# Patient Record
Sex: FEMALE | Race: BLACK OR AFRICAN AMERICAN | Employment: OTHER | URBAN - METROPOLITAN AREA
[De-identification: names, ages, dates, MRNs, and addresses within clinical notes are randomized per-mention and may not be internally consistent; named-entity substitution may affect disease eponyms.]

---

## 2017-01-17 ENCOUNTER — HOSPITAL ENCOUNTER (OUTPATIENT)
Dept: MAMMOGRAPHY | Age: 82
Discharge: HOME | End: 2017-01-17
Attending: FAMILY MEDICINE
Payer: MEDICARE

## 2017-01-17 DIAGNOSIS — Z79.52 LONG TERM CURRENT USE OF SYSTEMIC STEROIDS: ICD-10-CM

## 2017-01-17 DIAGNOSIS — Z78.0 POSTMENOPAUSAL STATE: ICD-10-CM

## 2017-01-17 PROCEDURE — 77080 DXA BONE DENSITY AXIAL: CPT

## 2017-03-02 ENCOUNTER — APPOINTMENT (OUTPATIENT)
Dept: GENERAL RADIOLOGY | Age: 82
End: 2017-03-02
Attending: EMERGENCY MEDICINE
Payer: MEDICARE

## 2017-03-02 ENCOUNTER — HOSPITAL ENCOUNTER (EMERGENCY)
Age: 82
Discharge: HOME | End: 2017-03-02
Attending: EMERGENCY MEDICINE
Payer: MEDICARE

## 2017-03-02 VITALS
OXYGEN SATURATION: 96 % | WEIGHT: 239 LBS | DIASTOLIC BLOOD PRESSURE: 51 MMHG | HEART RATE: 64 BPM | SYSTOLIC BLOOD PRESSURE: 167 MMHG | RESPIRATION RATE: 16 BRPM | HEIGHT: 67 IN | BODY MASS INDEX: 37.51 KG/M2 | TEMPERATURE: 98.3 F

## 2017-03-02 DIAGNOSIS — N18.9 CKD (CHRONIC KIDNEY DISEASE), UNSPECIFIED STAGE: ICD-10-CM

## 2017-03-02 DIAGNOSIS — D64.9 ANEMIA, UNSPECIFIED: ICD-10-CM

## 2017-03-02 DIAGNOSIS — R06.2 WHEEZING: Primary | ICD-10-CM

## 2017-03-02 LAB
ALBUMIN SERPL BCP-MCNC: 3 G/DL (ref 3.5–5)
ALBUMIN/GLOB SERPL: 0.7 {RATIO} (ref 1.1–2.2)
ALP SERPL-CCNC: 67 U/L (ref 45–117)
ALT SERPL-CCNC: 24 U/L (ref 12–78)
ANION GAP BLD CALC-SCNC: 13 MMOL/L (ref 5–15)
AST SERPL W P-5'-P-CCNC: 22 U/L (ref 15–37)
ATRIAL RATE: 63 BPM
BASOPHILS # BLD AUTO: 0 K/UL (ref 0–0.1)
BASOPHILS # BLD: 0 % (ref 0–1)
BILIRUB SERPL-MCNC: 0.3 MG/DL (ref 0.2–1)
BNP SERPL-MCNC: 1006 PG/ML (ref 0–450)
BUN SERPL-MCNC: 26 MG/DL (ref 6–20)
BUN/CREAT SERPL: 12 (ref 12–20)
CALCIUM SERPL-MCNC: 8.8 MG/DL (ref 8.5–10.1)
CALCULATED P AXIS, ECG09: 17 DEGREES
CALCULATED R AXIS, ECG10: -59 DEGREES
CHLORIDE SERPL-SCNC: 104 MMOL/L (ref 97–108)
CO2 SERPL-SCNC: 24 MMOL/L (ref 21–32)
CREAT SERPL-MCNC: 2.17 MG/DL (ref 0.55–1.02)
DIAGNOSIS, 93000: NORMAL
EOSINOPHIL # BLD: 0 K/UL (ref 0–0.4)
EOSINOPHIL NFR BLD: 0 % (ref 0–7)
ERYTHROCYTE [DISTWIDTH] IN BLOOD BY AUTOMATED COUNT: 17.8 % (ref 11.5–14.5)
GLOBULIN SER CALC-MCNC: 4.6 G/DL (ref 2–4)
GLUCOSE SERPL-MCNC: 83 MG/DL (ref 65–100)
HCT VFR BLD AUTO: 30.1 % (ref 35–47)
HGB BLD-MCNC: 9.3 G/DL (ref 11.5–16)
INR BLD: 2.8 (ref 0.9–1.2)
LYMPHOCYTES # BLD AUTO: 13 % (ref 12–49)
LYMPHOCYTES # BLD: 1.3 K/UL (ref 0.8–3.5)
MCH RBC QN AUTO: 24.7 PG (ref 26–34)
MCHC RBC AUTO-ENTMCNC: 30.9 G/DL (ref 30–36.5)
MCV RBC AUTO: 80.1 FL (ref 80–99)
MONOCYTES # BLD: 0.5 K/UL (ref 0–1)
MONOCYTES NFR BLD AUTO: 5 % (ref 5–13)
NEUTS SEG # BLD: 8.3 K/UL (ref 1.8–8)
NEUTS SEG NFR BLD AUTO: 82 % (ref 32–75)
P-R INTERVAL, ECG05: 116 MS
PLATELET # BLD AUTO: 269 K/UL (ref 150–400)
POTASSIUM SERPL-SCNC: 4.6 MMOL/L (ref 3.5–5.1)
PROT SERPL-MCNC: 7.6 G/DL (ref 6.4–8.2)
Q-T INTERVAL, ECG07: 444 MS
QRS DURATION, ECG06: 116 MS
QTC CALCULATION (BEZET), ECG08: 454 MS
RBC # BLD AUTO: 3.76 M/UL (ref 3.8–5.2)
SODIUM SERPL-SCNC: 141 MMOL/L (ref 136–145)
TROPONIN I SERPL-MCNC: <0.04 NG/ML
VENTRICULAR RATE, ECG03: 63 BPM
WBC # BLD AUTO: 10.1 K/UL (ref 3.6–11)

## 2017-03-02 PROCEDURE — 83880 ASSAY OF NATRIURETIC PEPTIDE: CPT | Performed by: EMERGENCY MEDICINE

## 2017-03-02 PROCEDURE — 74011000250 HC RX REV CODE- 250: Performed by: EMERGENCY MEDICINE

## 2017-03-02 PROCEDURE — 77030013140 HC MSK NEB VYRM -A

## 2017-03-02 PROCEDURE — 36415 COLL VENOUS BLD VENIPUNCTURE: CPT | Performed by: EMERGENCY MEDICINE

## 2017-03-02 PROCEDURE — 80053 COMPREHEN METABOLIC PANEL: CPT | Performed by: EMERGENCY MEDICINE

## 2017-03-02 PROCEDURE — 71020 XR CHEST PA LAT: CPT

## 2017-03-02 PROCEDURE — 85610 PROTHROMBIN TIME: CPT

## 2017-03-02 PROCEDURE — 84484 ASSAY OF TROPONIN QUANT: CPT | Performed by: EMERGENCY MEDICINE

## 2017-03-02 PROCEDURE — 99284 EMERGENCY DEPT VISIT MOD MDM: CPT

## 2017-03-02 PROCEDURE — 85025 COMPLETE CBC W/AUTO DIFF WBC: CPT | Performed by: EMERGENCY MEDICINE

## 2017-03-02 PROCEDURE — 93005 ELECTROCARDIOGRAM TRACING: CPT

## 2017-03-02 PROCEDURE — 94640 AIRWAY INHALATION TREATMENT: CPT

## 2017-03-02 RX ORDER — PREDNISONE 5 MG/1
7.5 TABLET ORAL DAILY
COMMUNITY

## 2017-03-02 RX ORDER — FLUTICASONE FUROATE AND VILANTEROL 100; 25 UG/1; UG/1
1 POWDER RESPIRATORY (INHALATION) DAILY
COMMUNITY

## 2017-03-02 RX ORDER — GLIPIZIDE 5 MG/1
2.5 TABLET ORAL
COMMUNITY

## 2017-03-02 RX ORDER — LANOLIN ALCOHOL/MO/W.PET/CERES
400 CREAM (GRAM) TOPICAL
COMMUNITY

## 2017-03-02 RX ORDER — WARFARIN 2 MG/1
6 TABLET ORAL 2 TIMES WEEKLY
COMMUNITY

## 2017-03-02 RX ORDER — ATORVASTATIN CALCIUM 20 MG/1
20 TABLET, FILM COATED ORAL
COMMUNITY

## 2017-03-02 RX ORDER — PROPRANOLOL HYDROCHLORIDE 10 MG/1
10 TABLET ORAL 2 TIMES DAILY
COMMUNITY

## 2017-03-02 RX ORDER — ALBUTEROL SULFATE 2.5 MG/.5ML
2.5 SOLUTION RESPIRATORY (INHALATION)
COMMUNITY

## 2017-03-02 RX ORDER — GLIPIZIDE 5 MG/1
5 TABLET ORAL
COMMUNITY

## 2017-03-02 RX ADMIN — ALBUTEROL SULFATE 1 DOSE: 2.5 SOLUTION RESPIRATORY (INHALATION) at 14:43

## 2017-03-02 NOTE — DISCHARGE INSTRUCTIONS
CONTINUE THE BREO AND NEBULIZER TREATMENTS. YOUR PRIMARY CARE DOCTOR SAID HE COULD SEE YOU ANY OF THE NEXT 3 DAYS IF NEEDED. KEEP YOUR APPOINTMENT WITH THE PULMONARY DOCTOR FOR Tuesday. RETURN TO THE ER IF WORSENING SHORTNESS OF BREATH, FEVER, CHEST PAIN, INCREASED LEG SWELLING OR ANY OTHER CONCERNS. Anemia: Care Instructions  Your Care Instructions    Anemia is a low level of red blood cells, which carry oxygen throughout your body. Many things can cause anemia. Lack of iron is one of the most common causes. Your body needs iron to make hemoglobin, a substance in red blood cells that carries oxygen from the lungs to your body's cells. Without enough iron, the body produces fewer and smaller red blood cells. As a result, your body's cells do not get enough oxygen, and you feel tired and weak. And you may have trouble concentrating. Bleeding is the most common cause of a lack of iron. You may have heavy menstrual bleeding or bleeding caused by conditions such as ulcers, hemorrhoids, or cancer. Regular use of aspirin or other anti-inflammatory medicines (such as ibuprofen) also can cause bleeding in some people. A lack of iron in your diet also can cause anemia, especially at times when the body needs more iron, such as during pregnancy, infancy, and the teen years. Your doctor may have prescribed iron pills. It may take several months of treatment for your iron levels to return to normal. Your doctor also may suggest that you eat foods that are rich in iron, such as meat and beans. There are many other causes of anemia. It is not always due to a lack of iron. Finding the specific cause of your anemia will help your doctor find the right treatment for you. Follow-up care is a key part of your treatment and safety. Be sure to make and go to all appointments, and call your doctor if you are having problems.  It's also a good idea to know your test results and keep a list of the medicines you take.  How can you care for yourself at home? · Take your medicines exactly as prescribed. Call your doctor if you think you are having a problem with your medicine. · If your doctor recommends iron pills, take them as directed:  ¨ Try to take the pills on an empty stomach about 1 hour before or 2 hours after meals. But you may need to take iron with food to avoid an upset stomach. ¨ Do not take antacids or drink milk or caffeine drinks (such as coffee, tea, or cola) at the same time or within 2 hours of the time that you take your iron. They can make it hard for your body to absorb the iron. ¨ Vitamin C (from food or supplements) helps your body absorb iron. Try taking iron pills with a glass of orange juice or some other food that is high in vitamin C, such as citrus fruits. ¨ Iron pills may cause stomach problems, such as heartburn, nausea, diarrhea, constipation, and cramps. Be sure to drink plenty of fluids, and include fruits, vegetables, and fiber in your diet each day. Iron pills often make your bowel movements dark or green. ¨ If you forget to take an iron pill, do not take a double dose of iron the next time you take a pill. ¨ Keep iron pills out of the reach of small children. An overdose of iron can be very dangerous. · Follow your doctor's advice about eating iron-rich foods. These include red meat, shellfish, poultry, eggs, beans, raisins, whole-grain bread, and leafy green vegetables. · Steam vegetables to help them keep their iron content. When should you call for help? Call 911 anytime you think you may need emergency care. For example, call if:  · You have symptoms of a heart attack. These may include:  ¨ Chest pain or pressure, or a strange feeling in the chest.  ¨ Sweating. ¨ Shortness of breath. ¨ Nausea or vomiting. ¨ Pain, pressure, or a strange feeling in the back, neck, jaw, or upper belly or in one or both shoulders or arms. ¨ Lightheadedness or sudden weakness.   ¨ A fast or irregular heartbeat. After you call 911, the  may tell you to chew 1 adult-strength or 2 to 4 low-dose aspirin. Wait for an ambulance. Do not try to drive yourself. · You passed out (lost consciousness). Call your doctor now or seek immediate medical care if:  · You have new or increased shortness of breath. · You are dizzy or lightheaded, or you feel like you may faint. · Your fatigue and weakness continue or get worse. · You have any abnormal bleeding, such as:  ¨ Nosebleeds. ¨ Vaginal bleeding that is different (heavier, more frequent, at a different time of the month) than what you are used to. ¨ Bloody or black stools, or rectal bleeding. ¨ Bloody or pink urine. Watch closely for changes in your health, and be sure to contact your doctor if:  · You do not get better as expected. Where can you learn more? Go to http://carleneViblioraza.info/. Enter R301 in the search box to learn more about \"Anemia: Care Instructions. \"  Current as of: February 5, 2016  Content Version: 11.1  © 5126-2137 [employer]. Care instructions adapted under license by [employer] (which disclaims liability or warranty for this information). If you have questions about a medical condition or this instruction, always ask your healthcare professional. [place] [address] any warranty or liability for your use of this information. Wheezing or Bronchoconstriction: Care Instructions  Your Care Instructions  Wheezing is a whistling noise made during breathing. It occurs when the small airways, or bronchial tubes, that lead to your lungs swell or contract (spasm) and become narrow. This narrowing is called bronchoconstriction. When your airways constrict, it is hard for air to pass through and this makes it hard for you to breathe. Wheezing and bronchoconstriction can be caused by many problems, including:  · An infection such as the flu or a cold.   · Allergies such as hay fever. · Diseases such as asthma or chronic obstructive pulmonary disease. · Smoking. Treatment for your wheezing depends on what is causing the problem. Your wheezing may get better without treatment. But you may need to pay attention to things that cause your wheezing and avoid them. Or you may need medicine to help treat the wheezing and to reduce the swelling or to relieve spasms in your lungs. Follow-up care is a key part of your treatment and safety. Be sure to make and go to all appointments, and call your doctor if you are having problems. It is also a good idea to know your test results and keep a list of the medicines you take. How can you care for yourself at home? · Take your medicine exactly as prescribed. Call your doctor if you think you are having a problem with your medicine. You will get more details on the specific medicine your doctor prescribes. · If your doctor prescribed antibiotics, take them as directed. Do not stop taking them just because you feel better. You need to take the full course of antibiotics. · Breathe moist air from a humidifier, hot shower, or sink filled with hot water. This may help ease your symptoms and make it easier for you to breathe. · If you have congestion in your nose and throat, drinking plenty of fluids, especially hot fluids, may help relieve your symptoms. If you have kidney, heart, or liver disease and have to limit fluids, talk with your doctor before you increase the amount of fluids you drink. · If you have mucus in your airways, it may help to breathe deeply and cough. · Do not smoke or allow others to smoke around you. Smoking can make your wheezing worse. If you need help quitting, talk to your doctor about stop-smoking programs and medicines. These can increase your chances of quitting for good. · Avoid things that may cause your wheezing.  These may include colds, smoke, air pollution, dust, pollen, pets, cockroaches, stress, and cold air. When should you call for help? Call 911 anytime you think you may need emergency care. For example, call if:  · You have severe trouble breathing. · You passed out (lost consciousness). Call your doctor now or seek immediate medical care if:  · You cough up yellow, dark brown, or bloody mucus (sputum). · You have new or worse shortness of breath. · Your wheezing is not getting better or it gets worse after you start taking your medicine. Watch closely for changes in your health, and be sure to contact your doctor if:  · You do not get better as expected. Where can you learn more? Go to http://carlene-raza.info/. Enter 454 9086 in the search box to learn more about \"Wheezing or Bronchoconstriction: Care Instructions. \"  Current as of: May 23, 2016  Content Version: 11.1  © 1078-4872 [employer]. Care instructions adapted under license by [employer] (which disclaims liability or warranty for this information). If you have questions about a medical condition or this instruction, always ask your healthcare professional. [address] any warranty or liability for your use of this information. We hope that we have addressed all of your medical concerns. The examination and treatment you received in the Emergency Department were for an emergent problem and were not intended as complete care. It is important that you follow up with your healthcare provider(s) for ongoing care. If your symptoms worsen or do not improve as expected, and you are unable to reach your usual health care provider(s), you should return to the Emergency Department. Today's healthcare is undergoing tremendous change, and patient satisfaction surveys are one of the many tools to assess the quality of medical care. You may receive a survey from the [employer] regarding your experience in the Emergency Department.   I hope that your experience has been completely positive, particularly the medical care that I provided. As such, please participate in the survey; anything less than excellent does not meet my expectations or intentions. [facility] and [facility] participate in nationally recognized quality of care measures. If your blood pressure is greater than 120/80, as reported below, we urge that you seek medical care to address the potential of high blood pressure, commonly known as hypertension. Hypertension can be hereditary or can be caused by certain medical conditions, pain, stress, or \"white coat syndrome. \"       Please make an appointment with your health care provider(s) for follow up of your Emergency Department visit. VITALS:   Patient Vitals for the past 8 hrs:   Temp Pulse Resp BP SpO2   03/02/17 1740 - - - - 92 %   03/02/17 1730 - - - 174/52 (!) 88 %   03/02/17 1720 - - - - 93 %   03/02/17 1719 - - - 173/52 (!) 89 %   03/02/17 1333 98.3 °F (36.8 °C) 64 16 176/74 95 %          Thank you for allowing us to provide you with medical care today. We realize that you have many choices for your emergency care needs. Please choose us in the future for any continued health care needs. [facility], [address] [place] [address].   Office: [phone]            Recent Results (from the past 24 hour(s))   EKG, 12 LEAD, INITIAL    Collection Time: 03/02/17  1:48 PM   Result Value Ref Range    Ventricular Rate 63 BPM    Atrial Rate 63 BPM    P-R Interval 116 ms    QRS Duration 116 ms    Q-T Interval 444 ms    QTC Calculation (Bezet) 454 ms    Calculated P Axis 17 degrees    Calculated R Axis -59 degrees    Diagnosis       Normal sinus rhythm  Left anterior fascicular block  Left ventricular hypertrophy with QRS widening  Abnormal ECG  When compared with ECG of 25-MAY-2016 11:31,  Ventricular rate increased from 47 beats to now 63 beats.    Confirmed by Anisa SCRUGGS, William Song ([id number]) on 3/2/2017 4:59:02 PM     CBC WITH AUTOMATED DIFF    Collection Time: 03/02/17  2:32 PM   Result Value Ref Range    WBC 10.1 3.6 - 11.0 K/uL    RBC 3.76 (L) 3.80 - 5.20 M/uL    HGB 9.3 (L) 11.5 - 16.0 g/dL    HCT 30.1 (L) 35.0 - 47.0 %    MCV 80.1 80.0 - 99.0 FL    MCH 24.7 (L) 26.0 - 34.0 PG    MCHC 30.9 30.0 - 36.5 g/dL    RDW 17.8 (H) 11.5 - 14.5 %    PLATELET 954 939 - 060 K/uL    NEUTROPHILS 82 (H) 32 - 75 %    LYMPHOCYTES 13 12 - 49 %    MONOCYTES 5 5 - 13 %    EOSINOPHILS 0 0 - 7 %    BASOPHILS 0 0 - 1 %    ABS. NEUTROPHILS 8.3 (H) 1.8 - 8.0 K/UL    ABS. LYMPHOCYTES 1.3 0.8 - 3.5 K/UL    ABS. MONOCYTES 0.5 0.0 - 1.0 K/UL    ABS. EOSINOPHILS 0.0 0.0 - 0.4 K/UL    ABS. BASOPHILS 0.0 0.0 - 0.1 K/UL   METABOLIC PANEL, COMPREHENSIVE    Collection Time: 03/02/17  2:32 PM   Result Value Ref Range    Sodium 141 136 - 145 mmol/L    Potassium 4.6 3.5 - 5.1 mmol/L    Chloride 104 97 - 108 mmol/L    CO2 24 21 - 32 mmol/L    Anion gap 13 5 - 15 mmol/L    Glucose 83 65 - 100 mg/dL    BUN 26 (H) 6 - 20 MG/DL    Creatinine 2.17 (H) 0.55 - 1.02 MG/DL    BUN/Creatinine ratio 12 12 - 20      GFR est AA 26 (L) >60 ml/min/1.73m2    GFR est non-AA 22 (L) >60 ml/min/1.73m2    Calcium 8.8 8.5 - 10.1 MG/DL    Bilirubin, total 0.3 0.2 - 1.0 MG/DL    ALT (SGPT) 24 12 - 78 U/L    AST (SGOT) 22 15 - 37 U/L    Alk. phosphatase 67 45 - 117 U/L    Protein, total 7.6 6.4 - 8.2 g/dL    Albumin 3.0 (L) 3.5 - 5.0 g/dL    Globulin 4.6 (H) 2.0 - 4.0 g/dL    A-G Ratio 0.7 (L) 1.1 - 2.2     TROPONIN I    Collection Time: 03/02/17  2:32 PM   Result Value Ref Range    Troponin-I, Qt. <0.04 <0.05 ng/mL   PRO-BNP    Collection Time: 03/02/17  2:32 PM   Result Value Ref Range    NT pro-BNP 1006 (H) 0 - 450 PG/ML   POC INR    Collection Time: 03/02/17  4:15 PM   Result Value Ref Range    INR (POC) 2.8 (H) <1.2         Xr Chest Pa Lat    Result Date: 3/2/2017  EXAM:  XR CHEST PA LAT. INDICATION: Wheezing. COMPARISON: 5/22/2016. FINDINGS: PA and lateral radiographs of the chest were obtained. Lungs: There are mild diffuse increased interstitial markings throughout the lungs which are probably unchanged. There is no mass, nodule or airspace disease. Pleura: There is no pleural effusion or pneumothorax. Mediastinum: The cardiac silhouette is enlarged and the aorta is atherosclerotic. Bones and soft tissues: There are surgical clips in the left axilla and there are degenerative changes of both shoulders. IMPRESSION: Cardiomegaly with mild interstitial edema or interstitial disease. No change.

## 2017-03-02 NOTE — ED PROVIDER NOTES
HPI Comments: 80 y.o. female with past medical history significant for HTN, high cholesterol, diabetes, and kidney failure who presents from home with chief complaint of cough. Pt states that she has had a cough for the past 3 years which has worsened over the course of the past 6-8 months. Pt's daughter claims that the pt has been very SOB and wheezing alot, and hasn't been able to talk on the phone well as a result. Pt says that every time she coughs, she experiences some urinary incontinence. Per daughter, pt told her 4 days ago that she was out of her Breo inhaler, which she started the pt on again in addition to breathing treatments, which the daughter says she is unsure if the pt is giving herself when when she is not there to monitor. Per daughter, pt has been on prednisone for \"years\", and the pt's dosage is being decreased from 10 mg to 7.5 mg. Per daughter, pt has a scheduled appointment with her PCP in 5 days in an attempt to have the pt placed with a pulmonologist, but pt's daughter felt as though the pt couldn't wait that long. Pt denies that her cough is productive. Pt denies fever, vomiting, diarrhea or CP. There are no other acute medical concerns at this time. PCP: Joe Adams MD     Note written by Chaz Palma. Bridgette Edgar, as dictated by Vanessa Marie MD 2:10 PM      The history is provided by the patient and a relative (daughter). No  was used.         Past Medical History:   Diagnosis Date    Arthritis     Asthma     Cough     Diabetes ([address]     Falls     Fatigue     H/O total mastectomy of left breast     Heart disease     High cholesterol     Hypertension     Kidney disease     Kidney failure     Memory loss     Rash     SOB (shortness of breath)        Past Surgical History:   Procedure Laterality Date    BREAST SURGERY PROCEDURE UNLISTED      left mastectomy    HX ORTHOPAEDIC      hip replacement         Family History:   Problem Relation Age of Onset    Asthma Mother     Cancer Mother     COPD Brother     Cancer Other        Social History     Social History    Marital status:      Spouse name: N/A    Number of children: N/A    Years of education: N/A     Occupational History    Not on file. Social History Main Topics    Smoking status: Former Smoker    Smokeless tobacco: Not on file    Alcohol use Yes    Drug use: Not on file    Sexual activity: Not on file     Other Topics Concern    Not on file     Social History Narrative         ALLERGIES: Lisinopril; Penicillin g; and Sulfa (sulfonamide antibiotics)    Review of Systems   Constitutional: Negative for fever. HENT: Negative for facial swelling. Eyes: Negative for visual disturbance. Respiratory: Positive for cough, shortness of breath and wheezing. Negative for chest tightness. Cardiovascular: Negative for chest pain. Gastrointestinal: Negative for abdominal pain, diarrhea and vomiting. Genitourinary: Negative for dysuria. Musculoskeletal: Negative for arthralgias. Skin: Negative for rash. Neurological: Negative for dizziness. Hematological: Negative for adenopathy. Psychiatric/Behavioral: Negative for suicidal ideas. All other systems reviewed and are negative. Vitals:    03/02/17 1333   BP: 176/74   Pulse: 64   Resp: 16   Temp: 98.3 °F (36.8 °C)   SpO2: 95%   Weight: 108.4 kg (239 lb)   Height: 5' 6.5\" (1.689 m)            Physical Exam   Constitutional: She is oriented to person, place, and time. She appears well-developed and well-nourished. No distress. HENT:   Head: Normocephalic and atraumatic. Mouth/Throat: Oropharynx is clear and moist.   Eyes: Pupils are equal, round, and reactive to light. No scleral icterus. Neck: Normal range of motion. Neck supple. No thyromegaly present. Cardiovascular: Normal rate, regular rhythm, normal heart sounds and intact distal pulses. No murmur heard.   Pulmonary/Chest: Effort normal. No respiratory distress. She has wheezes (expiratory). Abdominal: Soft. Bowel sounds are normal. She exhibits no distension. There is no tenderness. Musculoskeletal: Normal range of motion. She exhibits no edema. Neurological: She is alert and oriented to person, place, and time. Skin: Skin is warm and dry. No rash noted. She is not diaphoretic. Nursing note and vitals reviewed. Note written by Dunia York. Branden Hooker, as dictated by Jose Craig MD 2:10 PM      MDM  Number of Diagnoses or Management Options  Anemia, unspecified:   CKD (chronic kidney disease), unspecified stage:   Wheezing:   Diagnosis management comments: A:  Cough and wheezing for several years but worse over past 2 days. VS stable on baseline O2. No resp distress note. Slight expiratory wheeze present. DDx:  Bronchitis, pneumonia, CHF,  Progression of chronic lung disease    P:  ecg  cxr  Labs  duoneb  reassess    ED Course       Procedures    Labs and ECG unremarkable. BNP mildly elevated but not very significant and I don't feel this represents acute CHF. Pt signed out to Dr. Estefani Pérez pending results of CXR and reassessment following duoneb.

## 2017-03-02 NOTE — ED TRIAGE NOTES
Per pt's family, pt. Has a nonproductive cough for the past week with wheezing. States pain to chest on inspiration.

## 2017-03-02 NOTE — PROGRESS NOTES
[facility]: MED RECONCILIATION    Comments/Recommendations: - Verified allergies. - Daughter helps fill medications in a pill box and this helps to increase adherence. However, she still misses doses 3-4 times per week. This may occasionally include her warfarin.  - Patient is on coumadin for blood clot treatment and had an INR check this Tuesday 2/28/17. INR was SUPRAtherapeutic at 4.2. Patient was taking 7mg PO 6 times per week Sunday to Friday and 6mg on Saturday, this was decreased to 7mg PO 5 times per week Monday to Friday and 6mg on Saturday and Sunday. She has not taken and warfarin since appointment on Tuesday.     Medications added:     · Breo ellipta  · Albuterol nebulizer  · Propranolol    Medications removed:    · Amlodipine 10mg PO daily - D/C'ed 2/28/17 due to peripheral edema    Medications adjusted:    · Atorvastatin 40mg changed to 20mg PO nightly  · Iron dose decreased to 18mg elemental iron versus 27mg  · Furosemide 20mg adjusted to 2 tabs (40mg) PO daily  · Glipizide 5mg updated to 5mg PO daily before breakfast, and 2.5mg PO daily before bedtime  · Prednisone dose decreased from 10mg to 7.5mg PO daily    Information obtained from: Child, Patient, refill history    Significant PMH/Disease States:   Patient Active Problem List   Diagnosis Code    Hypertension I10    High cholesterol E78.00    TESS (acute kidney injury) ([facility] N17.9    CKD (chronic kidney disease) stage 3, GFR 30-59 ml/min N18.3    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 Taking? albuterol sulfate (PROVENTIL;VENTOLIN) 2.5 mg/0.5 mL nebu nebulizer solution 3/1/2017 at HS Child Yes Yes   Si.5 mg by Nebulization route every four (4) hours as needed for Wheezing. aspirin delayed-release 81 mg tablet 3/1/2017 at HS Child Yes Yes   Sig: Take 81 mg by mouth nightly. atorvastatin (LIPITOR) 20 mg tablet 3/1/2017 at HS Child Yes Yes   Sig: Take 20 mg by mouth nightly. cholecalciferol, VITAMIN D3, (VITAMIN D3) 5,000 unit tab tablet 2017 at AM Child Yes Yes   Sig: Take 5,000 Units by mouth daily. fluticasone-vilanterol (BREO ELLIPTA) 100-25 mcg/dose inhaler 2017 at AM Child Yes Yes   Sig: Take 1 Puff by inhalation daily. furosemide (LASIX) 20 mg tablet 3/2/2017 at AM Child Yes Yes   Sig: Take 40 mg by mouth daily. glipiZIDE (GLUCOTROL) 5 mg tablet 3/1/2017 at AM Child Yes Yes   Sig: Take 5 mg by mouth Daily (before breakfast). In addition to 2.5mg PO daily before bedtime   glipiZIDE (GLUCOTROL) 5 mg tablet 3/1/2017 at HS Child Yes Yes   Sig: Take 2.5 mg by mouth nightly. hydrALAZINE (APRESOLINE) 25 mg tablet 3/2/2017 at AM Child Yes Yes   Sig: Take 25 mg by mouth three (3) times daily. hydroxychloroquine (PLAQUENIL) 200 mg tablet 3/2/2017 at AM Child Yes Yes   Sig: Take 200 mg by mouth two (2) times a day. Indications: SYSTEMIC LUPUS ERYTHEMATOSUS   magnesium oxide (MAG-OX) 400 mg tablet 3/1/2017 at [address] [place] [address] Yes Yes   Sig: Take 400 mg by mouth daily (with lunch). multivitamin-ferrous sulfate 18 mg iron tab 2017 at AM Child Yes Yes   Sig: Take 18 mg by mouth daily. predniSONE (DELTASONE) 5 mg tablet 3/2/2017 at AM Child Yes Yes   Sig: Take 7.5 mg by mouth daily. propranolol (INDERAL) 10 mg tablet 3/2/2017 at AM Child Yes Yes   Sig: Take 10 mg by mouth two (2) times a day. sodium bicarbonate 325 mg tablet 3/2/2017 at AM Child Yes Yes   Sig: Take 325 mg by mouth two (2) times a day.    warfarin (COUMADIN) 2 mg tablet 2/27/2017 at HS Child Yes Yes   Sig: Take 7 mg by mouth five (5) days a week. Monday to Friday  Indications: DEEP VENOUS THROMBOSIS   warfarin (COUMADIN) 2 mg tablet 3/5/2016 at HS Child Yes Yes   Sig: Take 6 mg by mouth two (2) times a week.  On Saturday and Sunday   Indications: DEEP VENOUS THROMBOSIS      Facility-Administered Medications: None       Thank you,  Ameya Garg, PharmD     Contact: [phone]

## 2017-03-02 NOTE — ED NOTES
Pt given discharge instructions by provider. Opportunity given to ask questions. Pt wheeled to waiting area via wheelchair by family members.

## 2017-03-02 NOTE — ED NOTES
[facility] from Dr. Saloni Leblanc pending cxr.      6:02 PM  Minimal expiratory wheeze. sats low 90's. Has albuteral at home. D/w Dr. Laura Armenta and reviewed results with him. He recommends keeping steroids as is at 7.5 mg. Family states she has albuterol which they will continue. Pt has breo refill now. He can see her over the weekend if necessary. She has f/u with pulmonary in 5 days - Dr. Antoni Dahl. 6:03 PM  Patient's results have been reviewed with them. SATS 93% RA. Patient and/or family have verbally conveyed their understanding and agreement of the patient's signs, symptoms, diagnosis, treatment and prognosis and additionally agree to follow up as recommended or return to the Emergency Room should their condition change prior to follow-up. Discharge instructions have also been provided to the patient with some educational information regarding their diagnosis as well a list of reasons why they would want to return to the ER prior to their follow-up appointment should their condition change. Recent Results (from the past 24 hour(s))   EKG, 12 LEAD, INITIAL    Collection Time: 03/02/17  1:48 PM   Result Value Ref Range    Ventricular Rate 63 BPM    Atrial Rate 63 BPM    P-R Interval 116 ms    QRS Duration 116 ms    Q-T Interval 444 ms    QTC Calculation (Bezet) 454 ms    Calculated P Axis 17 degrees    Calculated R Axis -59 degrees    Diagnosis       Normal sinus rhythm  Left anterior fascicular block  Left ventricular hypertrophy with QRS widening  Abnormal ECG  When compared with ECG of 25-MAY-2016 11:31,  Ventricular rate increased from 47 beats to now 63 beats.    Confirmed by Anisa SCRUGGS, [address] [place] [address] ([id number]) on 3/2/2017 4:59:02 PM     CBC WITH AUTOMATED DIFF    Collection Time: 03/02/17  2:32 PM   Result Value Ref Range    WBC 10.1 3.6 - 11.0 K/uL    RBC 3.76 (L) 3.80 - 5.20 M/uL    HGB 9.3 (L) 11.5 - 16.0 g/dL    HCT 30.1 (L) 35.0 - 47.0 %    MCV 80.1 80.0 - 99.0 FL    MCH 24.7 (L) 26.0 - 34.0 PG MCHC 30.9 30.0 - 36.5 g/dL    RDW 17.8 (H) 11.5 - 14.5 %    PLATELET 719 742 - 617 K/uL    NEUTROPHILS 82 (H) 32 - 75 %    LYMPHOCYTES 13 12 - 49 %    MONOCYTES 5 5 - 13 %    EOSINOPHILS 0 0 - 7 %    BASOPHILS 0 0 - 1 %    ABS. NEUTROPHILS 8.3 (H) 1.8 - 8.0 K/UL    ABS. LYMPHOCYTES 1.3 0.8 - 3.5 K/UL    ABS. MONOCYTES 0.5 0.0 - 1.0 K/UL    ABS. EOSINOPHILS 0.0 0.0 - 0.4 K/UL    ABS. BASOPHILS 0.0 0.0 - 0.1 K/UL   METABOLIC PANEL, COMPREHENSIVE    Collection Time: 03/02/17  2:32 PM   Result Value Ref Range    Sodium 141 136 - 145 mmol/L    Potassium 4.6 3.5 - 5.1 mmol/L    Chloride 104 97 - 108 mmol/L    CO2 24 21 - 32 mmol/L    Anion gap 13 5 - 15 mmol/L    Glucose 83 65 - 100 mg/dL    BUN 26 (H) 6 - 20 MG/DL    Creatinine 2.17 (H) 0.55 - 1.02 MG/DL    BUN/Creatinine ratio 12 12 - 20      GFR est AA 26 (L) >60 ml/min/1.73m2    GFR est non-AA 22 (L) >60 ml/min/1.73m2    Calcium 8.8 8.5 - 10.1 MG/DL    Bilirubin, total 0.3 0.2 - 1.0 MG/DL    ALT (SGPT) 24 12 - 78 U/L    AST (SGOT) 22 15 - 37 U/L    Alk. phosphatase 67 45 - 117 U/L    Protein, total 7.6 6.4 - 8.2 g/dL    Albumin 3.0 (L) 3.5 - 5.0 g/dL    Globulin 4.6 (H) 2.0 - 4.0 g/dL    A-G Ratio 0.7 (L) 1.1 - 2.2     TROPONIN I    Collection Time: 03/02/17  2:32 PM   Result Value Ref Range    Troponin-I, Qt. <0.04 <0.05 ng/mL   PRO-BNP    Collection Time: 03/02/17  2:32 PM   Result Value Ref Range    NT pro-BNP 1006 (H) 0 - 450 PG/ML   POC INR    Collection Time: 03/02/17  4:15 PM   Result Value Ref Range    INR (POC) 2.8 (H) <1.2         Xr Chest Pa Lat    Result Date: 3/2/2017  EXAM:  XR CHEST PA LAT. INDICATION: Wheezing. COMPARISON: 5/22/2016. FINDINGS: PA and lateral radiographs of the chest were obtained. Lungs: There are mild diffuse increased interstitial markings throughout the lungs which are probably unchanged. There is no mass, nodule or airspace disease. Pleura: There is no pleural effusion or pneumothorax.  Mediastinum: The cardiac silhouette is enlarged and the aorta is atherosclerotic. Bones and soft tissues: There are surgical clips in the left axilla and there are degenerative changes of both shoulders. IMPRESSION: Cardiomegaly with mild interstitial edema or interstitial disease. No change.

## 2017-05-26 ENCOUNTER — HOSPITAL ENCOUNTER (EMERGENCY)
Age: 82
Discharge: HOME | End: 2017-05-26
Attending: EMERGENCY MEDICINE
Payer: MEDICARE

## 2017-05-26 ENCOUNTER — APPOINTMENT (OUTPATIENT)
Dept: GENERAL RADIOLOGY | Age: 82
End: 2017-05-26
Attending: EMERGENCY MEDICINE
Payer: MEDICARE

## 2017-05-26 VITALS
BODY MASS INDEX: 35.84 KG/M2 | TEMPERATURE: 98 F | DIASTOLIC BLOOD PRESSURE: 68 MMHG | HEART RATE: 58 BPM | OXYGEN SATURATION: 96 % | WEIGHT: 223 LBS | HEIGHT: 66 IN | RESPIRATION RATE: 20 BRPM | SYSTOLIC BLOOD PRESSURE: 158 MMHG

## 2017-05-26 DIAGNOSIS — M79.604 RIGHT LEG PAIN: Primary | ICD-10-CM

## 2017-05-26 LAB — INR BLD: 2.7 (ref 0.9–1.2)

## 2017-05-26 PROCEDURE — 74011250637 HC RX REV CODE- 250/637: Performed by: EMERGENCY MEDICINE

## 2017-05-26 PROCEDURE — 73552 X-RAY EXAM OF FEMUR 2/>: CPT

## 2017-05-26 PROCEDURE — 99283 EMERGENCY DEPT VISIT LOW MDM: CPT

## 2017-05-26 PROCEDURE — 85610 PROTHROMBIN TIME: CPT

## 2017-05-26 RX ORDER — TRAMADOL HYDROCHLORIDE 50 MG/1
50 TABLET ORAL
Qty: 10 TAB | Refills: 0 | Status: SHIPPED | OUTPATIENT
Start: 2017-05-26 | End: 2017-06-05

## 2017-05-26 RX ORDER — ACETAMINOPHEN 325 MG/1
650 TABLET ORAL
Status: COMPLETED | OUTPATIENT
Start: 2017-05-26 | End: 2017-05-26

## 2017-05-26 RX ADMIN — ACETAMINOPHEN 650 MG: 325 TABLET ORAL at 14:58

## 2017-05-26 NOTE — DISCHARGE INSTRUCTIONS
Leg Pain: Care Instructions  Your Care Instructions  Many things can cause leg pain. Too much exercise or overuse can cause a muscle cramp (or charley horse). You can get leg cramps from not eating a balanced diet that has enough potassium, calcium, and other minerals. If you do not drink enough fluids or are taking certain medicines, you may develop leg cramps. Other causes of leg pain include injuries, blood flow problems, nerve damage, and twisted and enlarged veins (varicose veins). You can usually ease pain with self-care. Your doctor may recommend that you rest your leg and keep it elevated. Follow-up care is a key part of your treatment and safety. Be sure to make and go to all appointments, and call your doctor if you are having problems. Its also a good idea to know your test results and keep a list of the medicines you take. How can you care for yourself at home? · Take pain medicines exactly as directed. ¨ If the doctor gave you a prescription medicine for pain, take it as prescribed. ¨ If you are not taking a prescription pain medicine, ask your doctor if you can take an over-the-counter medicine. · Take any other medicines exactly as prescribed. Call your doctor if you think you are having a problem with your medicine. · Rest your leg while you have pain, and avoid standing for long periods of time. · Prop up your leg at or above the level of your heart when possible. · Make sure you are eating a balanced diet that is rich in calcium, potassium, and magnesium, especially if you are pregnant. · If directed by your doctor, put ice or a cold pack on the area for 10 to 20 minutes at a time. Put a thin cloth between the ice and your skin. · Your leg may be in a splint, a brace, or an elastic bandage, and you may have crutches to help you walk. Follow your doctor's directions about how long to wear supports and how to use the crutches. When should you call for help?   Call 911 anytime you think you may need emergency care. For example, call if:  · You have sudden chest pain and shortness of breath, or you cough up blood. · Your leg is cool or pale or changes color. Call your doctor now or seek immediate medical care if:  · You have increasing or severe pain. · Your leg suddenly feels weak and you cannot move it. · You have signs of a blood clot, such as:  ¨ Pain in your calf, back of the knee, thigh, or groin. ¨ Redness and swelling in your leg or groin. · You have signs of infection, such as:  ¨ Increased pain, swelling, warmth, or redness. ¨ Red streaks leading from the sore area. ¨ Pus draining from a place on your leg. ¨ A fever. · You cannot bear weight on your leg. Watch closely for changes in your health, and be sure to contact your doctor if:  · You do not get better as expected. Where can you learn more? Go to http://carlene-raza.info/. Enter X936 in the search box to learn more about \"Leg Pain: Care Instructions. \"  Current as of: May 27, 2016  Content Version: 11.2  © 6546-1422 [employer]. Care instructions adapted under license by [employer] (which disclaims liability or warranty for this information). If you have questions about a medical condition or this instruction, always ask your healthcare professional. [address] any warranty or liability for your use of this information. We hope that we have addressed all of your medical concerns. The examination and treatment you received in the Emergency Department were for an emergent problem and were not intended as complete care. It is important that you follow up with your healthcare provider(s) for ongoing care. If your symptoms worsen or do not improve as expected, and you are unable to reach your usual health care provider(s), you should return to the Emergency Department.       Today's healthcare is undergoing tremendous change, and patient satisfaction surveys are one of the many tools to assess the quality of medical care. You may receive a survey from the [employer] organization regarding your experience in the Emergency Department. I hope that your experience has been completely positive, particularly the medical care that I provided. As such, please participate in the survey; anything less than excellent does not meet my expectations or intentions. [facility] and [facility] participate in nationally recognized quality of care measures. If your blood pressure is greater than 120/80, as reported below, we urge that you seek medical care to address the potential of high blood pressure, commonly known as hypertension. Hypertension can be hereditary or can be caused by certain medical conditions, pain, stress, or \"white coat syndrome. \"       Please make an appointment with your health care provider(s) for follow up of your Emergency Department visit. VITALS:   Patient Vitals for the past 8 hrs:   Temp Pulse Resp BP SpO2   05/26/17 1315 98 °F (36.7 °C) (!) 58 20 158/68 96 %          Thank you for allowing us to provide you with medical care today. We realize that you have many choices for your emergency care needs. Please choose us in the future for any continued health care needs. Xochitl Sullivan, [facility]   Office: [phone]            Recent Results (from the past 24 hour(s))   POC INR    Collection Time: 05/26/17  3:47 PM   Result Value Ref Range    INR (POC) 2.7 (H) <1.2         Xr Femur Rt 2 Vs    Result Date: 5/26/2017  EXAM:  XR FEMUR RT 2 VS INDICATION:   atraumatic upper leg pain. Additional history: Right hip pain x3 days radiating into the thigh. COMPARISON: None. FINDINGS: Two views of the right femur demonstrate right hip arthroplasty without evidence of hardware complication. No visible fracture. Osteopenia. IMPRESSION: 1. Right hip arthroplasty without evidence of hardware complication.

## 2017-05-26 NOTE — ED PROVIDER NOTES
HPI Comments: 80 y.o. female with past medical history significant for HTN, high cholesterol, diabetes, kidney failure, heart disease, arthritis, asthma, and total mastectomy who presents with chief complaint of leg pain. Pt complains of R-hip pain and R-medial thigh pain that began 2 days ago. Pt notes that she is s/p R-hip replacement; performed by Dr. Ashley Schwarz in 2005. Pt denies any recent fall or injury to her leg. She reports being able to ambulate \"poorly\" at baseline. Pt notes additional sx of bilateral foot numbness, which is chronic. Pt took tylenol at 1000 today (~4.5 hours ago) with some relief. She denies any fever, dysuria, back pain, abd pain, or new urinary incontinence. There are no other acute medical concerns at this time. PCP: Cristiano Kumar MD    Note written by Lis Russo, as dictated by Petey Jara MD 2:48 PM      The history is provided by the patient. No  was used. Past Medical History:   Diagnosis Date    Arthritis     Asthma     Cough     Diabetes ([address]     Falls     Fatigue     H/O total mastectomy of left breast     Heart disease     High cholesterol     Hypertension     Kidney disease     Kidney failure     Memory loss     Rash     SOB (shortness of breath)        Past Surgical History:   Procedure Laterality Date    BREAST SURGERY PROCEDURE UNLISTED      left mastectomy    HX ORTHOPAEDIC      hip replacement         Family History:   Problem Relation Age of Onset    Asthma Mother     Cancer Mother     COPD Brother     Cancer Other        Social History     Social History    Marital status:      Spouse name: N/A    Number of children: N/A    Years of education: N/A     Occupational History    Not on file.      Social History Main Topics    Smoking status: Former Smoker    Smokeless tobacco: Not on file    Alcohol use Yes    Drug use: No    Sexual activity: Not on file     Other Topics Concern    Not on file     Social History Narrative         ALLERGIES: Lisinopril; Penicillin g; and Sulfa (sulfonamide antibiotics)    Review of Systems   Constitutional: Negative for fever. Gastrointestinal: Negative for abdominal pain. Genitourinary: Negative for difficulty urinating and dysuria. Musculoskeletal: Positive for arthralgias (R-hip) and myalgias (RLE). Negative for back pain. Neurological: Positive for numbness (bilateral feet). All other systems reviewed and are negative.       Vitals:    05/26/17 1315   BP: 158/68   Pulse: (!) 58   Resp: 20   Temp: 98 °F (36.7 °C)   SpO2: 96%   Weight: 101.2 kg (223 lb)   Height: 5' 5.5\" (1.664 m)            Physical Exam   Physical Examination: General appearance - alert, elderly, chronically ill appearing, mild distress, oriented to person, place, and time and normal appearing weight  Eyes - pupils equal and reactive, extraocular eye movements intact  Neck - supple, no significant adenopathy  Chest - clear to auscultation, no wheezes, rales or rhonchi, symmetric air entry  Heart - normal rate, regular rhythm, normal S1, S2, no murmurs, rubs, clicks or gallops  Abdomen - soft, nontender, nondistended, no masses or organomegaly  Back exam - full range of motion, no tenderness, palpable spasm or pain on motion  Neurological - alert, oriented, normal speech, no focal findings or movement disorder noted  Musculoskeletal - well healed surgical scar to right lateral hip, full rom of right hip, NVI distally with strong pedal pulses, mild tenderness to left upper thigh medially, no rash or swelling, no palpable cords  Extremities - peripheral pulses normal, no pedal edema, no clubbing or cyanosis  Skin - normal coloration and turgor, no rashes, no suspicious skin lesions noted  MDM  Number of Diagnoses or Management Options  Right leg pain:      Amount and/or Complexity of Data Reviewed  Clinical lab tests: ordered and reviewed  Tests in the radiology section of CPT®: ordered and reviewed  Decide to obtain previous medical records or to obtain history from someone other than the patient: yes  Obtain history from someone other than the patient: yes (son)  Review and summarize past medical records: yes  Discuss the patient with other providers: yes (pcp)  Independent visualization of images, tracings, or specimens: yes      ED Course       Procedures    Son states pt with leg pain for a while. Seen by vascular a few days ago and had negative doppler of legs. INR 2.7. Will d/c with pcp f/u. Discussed with Dr. Rita Phelps, on call for Dr. Yohana Franco. Pt is able to ambulate. Son ok with plan for d/c.

## 2017-05-26 NOTE — ED TRIAGE NOTES
Right hip pain since Tuesday this week, pain goes down right hip and into right leg anterior thigh. No pain at rest. Denies fall or any injury. [facility] hip replacement 2005. Called ortho but unable to get in until July.

## 2017-06-28 ENCOUNTER — HOSPITAL ENCOUNTER (OUTPATIENT)
Dept: CT IMAGING | Age: 82
Discharge: HOME | End: 2017-06-28
Attending: NURSE PRACTITIONER
Payer: MEDICARE

## 2017-06-28 DIAGNOSIS — R91.8 ABNORMAL CT SCAN, LUNG: ICD-10-CM

## 2017-06-28 PROCEDURE — 71250 CT THORAX DX C-: CPT

## 2017-07-14 ENCOUNTER — HOSPITAL ENCOUNTER (EMERGENCY)
Age: 82
Discharge: HOME | End: 2017-07-14
Attending: EMERGENCY MEDICINE | Admitting: EMERGENCY MEDICINE
Payer: MEDICARE

## 2017-07-14 VITALS
OXYGEN SATURATION: 94 % | TEMPERATURE: 98 F | WEIGHT: 213 LBS | HEART RATE: 67 BPM | DIASTOLIC BLOOD PRESSURE: 74 MMHG | HEIGHT: 66 IN | SYSTOLIC BLOOD PRESSURE: 135 MMHG | RESPIRATION RATE: 15 BRPM | BODY MASS INDEX: 34.23 KG/M2

## 2017-07-14 DIAGNOSIS — E16.2 HYPOGLYCEMIA: Primary | ICD-10-CM

## 2017-07-14 LAB
ANION GAP BLD CALC-SCNC: 11 MMOL/L (ref 5–15)
BUN SERPL-MCNC: 27 MG/DL (ref 6–20)
BUN/CREAT SERPL: 12 (ref 12–20)
CALCIUM SERPL-MCNC: 8.6 MG/DL (ref 8.5–10.1)
CHLORIDE SERPL-SCNC: 105 MMOL/L (ref 97–108)
CO2 SERPL-SCNC: 25 MMOL/L (ref 21–32)
CREAT SERPL-MCNC: 2.33 MG/DL (ref 0.55–1.02)
GLUCOSE BLD STRIP.AUTO-MCNC: 100 MG/DL (ref 65–100)
GLUCOSE BLD STRIP.AUTO-MCNC: 104 MG/DL (ref 65–100)
GLUCOSE BLD STRIP.AUTO-MCNC: 74 MG/DL (ref 65–100)
GLUCOSE BLD STRIP.AUTO-MCNC: 80 MG/DL (ref 65–100)
GLUCOSE SERPL-MCNC: 136 MG/DL (ref 65–100)
POTASSIUM SERPL-SCNC: 3.4 MMOL/L (ref 3.5–5.1)
SERVICE CMNT-IMP: ABNORMAL
SERVICE CMNT-IMP: NORMAL
SODIUM SERPL-SCNC: 141 MMOL/L (ref 136–145)

## 2017-07-14 PROCEDURE — 99285 EMERGENCY DEPT VISIT HI MDM: CPT

## 2017-07-14 PROCEDURE — 36415 COLL VENOUS BLD VENIPUNCTURE: CPT | Performed by: EMERGENCY MEDICINE

## 2017-07-14 PROCEDURE — 80048 BASIC METABOLIC PNL TOTAL CA: CPT | Performed by: EMERGENCY MEDICINE

## 2017-07-14 PROCEDURE — 82962 GLUCOSE BLOOD TEST: CPT

## 2017-07-14 RX ORDER — SODIUM CHLORIDE 0.9 % (FLUSH) 0.9 %
5-10 SYRINGE (ML) INJECTION EVERY 8 HOURS
Status: DISCONTINUED | OUTPATIENT
Start: 2017-07-14 | End: 2017-07-14 | Stop reason: HOSPADM

## 2017-07-14 RX ORDER — SODIUM CHLORIDE 0.9 % (FLUSH) 0.9 %
5-10 SYRINGE (ML) INJECTION AS NEEDED
Status: DISCONTINUED | OUTPATIENT
Start: 2017-07-14 | End: 2017-07-14 | Stop reason: HOSPADM

## 2017-07-14 RX ORDER — DEXTROSE 50 % IN WATER (D50W) INTRAVENOUS SYRINGE
2.5 AS NEEDED
Status: DISCONTINUED | OUTPATIENT
Start: 2017-07-14 | End: 2017-07-14 | Stop reason: HOSPADM

## 2017-07-14 NOTE — ED NOTES
Bedside and Verbal shift change report given to Tomi [address] (oncoming nurse) by [facility] (offgoing nurse). Report included the following information SBAR, Kardex, ED Summary and MAR.

## 2017-07-14 NOTE — ED NOTES
Patient instructed to eat food to help keep her blood sugar up. Per Alejo Muhammad she needs to be observed and have a repeat BS at 330.

## 2017-07-14 NOTE — DISCHARGE INSTRUCTIONS
Learning About Low Blood Sugar (Hypoglycemia) in Diabetes  What is low blood sugar (hypoglycemia)? Hypoglycemia means that your blood sugar is low and your body (especially your brain) is not getting enough fuel. If you have diabetes, your blood sugar can go too low if you take too much of some diabetes medicines. It can also go too low if you miss a meal. And it can happen if you exercise too hard without eating enough food. Some medicines used to treat other health problems can cause low blood sugar too. What are the symptoms? Symptoms of low blood sugar can start quickly. It may take just 10 to 15 minutes. If you have had diabetes for many years, you may not realize that your blood sugar is low until it drops very low. · If your blood sugar level drops below 70 (mild low blood sugar), you may feel tired, anxious, dizzy, weak, shaky, or sweaty. You may have a fast heartbeat or blurry vision. · If your blood sugar level continues to drop (usually below 40), your behavior may change. You may feel more irritable. You may find it hard to concentrate or talk. And you may feel unsteady when you stand or walk. You may become too weak or confused to eat something with sugar to raise your blood sugar level. · If your blood sugar level drops very low (usually below 20), you may pass out (lose consciousness). Or you may have a seizure or stroke. If you have symptoms of severe low blood sugar, you need to get medical care right away. If you had a low blood sugar level during the night, you may wake up tired or with a headache. Or you may sweat so much during the night that your pajamas or sheets are damp when you wake up. How is low blood sugar treated? You can treat low blood sugar by eating or drinking something that has 15 grams of carbohydrate. These should be quick-sugar foods.  Check your blood sugar level again 15 minutes after having a quick-sugar food to make sure your level is getting back to your target range. Here are examples of quick-sugar foods that have 15 grams of carbohydrate:  · 3 to 4 glucose tablets  · 1 tube of glucose gel  · Hard candy (such as 3 Jolly Ranchers or 5 to 7 Life Savers)  · 1 tablespoon honey  · 2 tablespoons of raisins  · ½ cup to ¾ cup (4 to 6 ounces) of fruit juice or regular (not diet) soda  · 1 tablespoon of sugar  · 1 cup of fat-free milk  If you have problems with severe low blood sugar, someone else may have to give you a shot of glucagon. This is a hormone that raises blood sugar levels quickly. How can you prevent low blood sugar? You can take steps to prevent low blood sugar. · Follow your treatment plan. Take your insulin or other diabetes medicine exactly as your doctor prescribed it. Talk with your doctor if you're having low blood sugar often. Your medicine may need to be adjusted if it's causing your low blood sugar. · Check your blood sugar levels often. This helps you find early changes before an emergency happens. · Keep a quick-sugar food with you in case your blood sugar level drops low. · Eat small meals more often so that you don't get too hungry between meals. Don't skip meals. · Balance extra exercise with eating more. Check your blood sugar and learn how it changes after exercise. If your blood sugar stays at a normal level, you may not need to eat after you exercise. · Limit how much alcohol you drink. Alcohol can make low blood sugar go even lower. Don't drink alcohol if you have problems recognizing the early signs of low blood sugar. · Keep a diary of your symptoms. This helps you learn when changes in your body may signal low blood sugar. And keep track of how often you have low blood sugar, including when you last ate and what you ate. This will help you learn what causes your blood sugar to drop. · Learn about diabetes and low blood sugar.  Support groups or a diabetes education center can help you understand how medicines, diet, and exercise affect your blood sugar levels. Since low blood sugar levels can quickly become an emergency, be sure to wear medical alert jewelry, such as a medical alert bracelet. This is to let people know you have diabetes so they can get help for you. You can buy this at most drugstores. And make sure your family, friends, and coworkers know the symptoms of low blood sugar. Teach them what to do to get your sugar level up. Follow-up care is a key part of your treatment and safety. Be sure to make and go to all appointments, and call your doctor if you are having problems. It's also a good idea to know your test results and keep a list of the medicines you take. Where can you learn more? Go to http://carlene-raza.info/. Enter M437 in the search box to learn more about \"Learning About Low Blood Sugar (Hypoglycemia) in Diabetes. \"  Current as of: March 13, 2017  Content Version: 11.3  © 0237-6746 [employer]. Care instructions adapted under license by [employer] (which disclaims liability or warranty for this information). If you have questions about a medical condition or this instruction, always ask your healthcare professional. [address] any warranty or liability for your use of this information. We hope that we have addressed all of your medical concerns. The examination and treatment you received in the Emergency Department were for an emergent problem and were not intended as complete care. It is important that you follow up with your healthcare provider(s) for ongoing care. If your symptoms worsen or do not improve as expected, and you are unable to reach your usual health care provider(s), you should return to the Emergency Department. Today's healthcare is undergoing tremendous change, and patient satisfaction surveys are one of the many tools to assess the quality of medical care.   You may receive a survey from the [facility] regarding your experience in the Emergency Department. I hope that your experience has been completely positive, particularly the medical care that I provided. As such, please participate in the survey; anything less than excellent does not meet my expectations or intentions. [facility] and [facility] participate in nationally recognized quality of care measures. If your blood pressure is greater than 120/80, as reported below, we urge that you seek medical care to address the potential of high blood pressure, commonly known as hypertension. Hypertension can be hereditary or can be caused by certain medical conditions, pain, stress, or \"white coat syndrome. \"       Please make an appointment with your health care provider(s) for follow up of your Emergency Department visit. VITALS:   Patient Vitals for the past 8 hrs:   Temp Pulse Resp BP SpO2   07/14/17 1430 - - - 135/74 94 %   07/14/17 1400 - - - 139/63 95 %   07/14/17 1330 - - - 138/75 95 %   07/14/17 1220 98 °F (36.7 °C) 67 15 (!) 174/97 91 %          Thank you for allowing us to provide you with medical care today. We realize that you have many choices for your emergency care needs. Please choose us in the future for any continued health care needs. Regards,           Bernard Rivera, [address]: [phone]            Recent Results (from the past 24 hour(s))   GLUCOSE, POC    Collection Time: 07/14/17 12:18 PM   Result Value Ref Range    Glucose (POC) 74 65 - 100 mg/dL    Performed by Madelin Birmingham, POC    Collection Time: 07/14/17 12:34 PM   Result Value Ref Range    Glucose (POC) 80 65 - 100 mg/dL    Performed by Manjit Elizalde    METABOLIC PANEL, BASIC    Collection Time: 07/14/17  1:19 PM   Result Value Ref Range    Sodium 141 136 - 145 mmol/L    Potassium 3.4 (L) 3.5 - 5.1 mmol/L    Chloride 105 97 - 108 mmol/L CO2 25 21 - 32 mmol/L    Anion gap 11 5 - 15 mmol/L    Glucose 136 (H) 65 - 100 mg/dL    BUN 27 (H) 6 - 20 MG/DL    Creatinine 2.33 (H) 0.55 - 1.02 MG/DL    BUN/Creatinine ratio 12 12 - 20      GFR est AA 24 (L) >60 ml/min/1.73m2    GFR est non-AA 20 (L) >60 ml/min/1.73m2    Calcium 8.6 8.5 - 10.1 MG/DL   GLUCOSE, POC    Collection Time: 07/14/17  2:38 PM   Result Value Ref Range    Glucose (POC) 104 (H) 65 - 100 mg/dL    Performed by Salli Hamman    GLUCOSE, POC    Collection Time: 07/14/17  3:29 PM   Result Value Ref Range    Glucose (POC) 100 65 - 100 mg/dL    Performed by Aníbal Pearl        No results found.

## 2017-07-14 NOTE — ED NOTES
Unable to obtain Christian Chapman at bedside to attempt. Patient given drink, crackers and peanut butter.

## 2017-07-14 NOTE — ED TRIAGE NOTES
Patient arrives by EMS for AMS and low blood sugar. Gave glucagon IM, sugar went from 25 to 66 per EMS.

## 2017-07-14 NOTE — ED NOTES
Patient discharged by provider. Patient has no new complaints at this time. Patient leaves in wheelchair with family member who is driving her home.

## 2020-11-06 NOTE — ED NOTES
Addended by: MELVA OLIVEIRA on: 11/6/2020 02:21 PM     Modules accepted: Level of Service     Patient in X-ray